# Patient Record
(demographics unavailable — no encounter records)

---

## 2024-10-15 NOTE — ASSESSMENT
[FreeTextEntry1] : 21 yo female with recent episode of right pyelonephritis now improved.  I will change her abx to Augmentin based on outpatient Ucx results.  She will continue the abx for an additional 7 days.  She will RTC as needed.

## 2024-10-15 NOTE — HISTORY OF PRESENT ILLNESS
[FreeTextEntry1] : 21 yo female presents for outpatient follow up for recent episode of right pyelonephritis.  She was initially treated as an outpatient and then presented to an outside hospital for high fevers and severe flank pain. She was unhappy with her care at the OSH and came to [facility] where she was admitted to the medical service for IV abx.  She was treated with Zosyn and then discharged on Bactrim.  The hospital culture came back negative but her outpatient Cx was growing Bactrim resistant E. coli.  She has been feeling well since her discharge from the hospital and denies any fevers or urinary symptoms.  She denies any prior hx of pyelonephritis.

## 2024-10-15 NOTE — PHYSICAL EXAM
[Normal Appearance] : normal appearance [General Appearance - In No Acute Distress] : no acute distress [Heart Rate And Rhythm] : heart rate and rhythm were normal [] : no respiratory distress [Abdomen Soft] : soft [Abdomen Tenderness] : non-tender [Costovertebral Angle Tenderness] : no ~M costovertebral angle tenderness [Normal Station and Gait] : the gait and station were normal for the patient's age [Skin Color & Pigmentation] : normal skin color and pigmentation [No Focal Deficits] : no focal deficits [Oriented To Time, Place, And Person] : oriented to person, place, and time [Not Anxious] : not anxious

## 2024-11-06 NOTE — PHYSICAL EXAM
[Normal Appearance] : normal appearance [Heart Rate And Rhythm] : heart rate and rhythm were normal [General Appearance - In No Acute Distress] : no acute distress [] : no respiratory distress [Abdomen Soft] : soft [Abdomen Tenderness] : non-tender [Costovertebral Angle Tenderness] : no ~M costovertebral angle tenderness [Normal Station and Gait] : the gait and station were normal for the patient's age [Skin Color & Pigmentation] : normal skin color and pigmentation [No Focal Deficits] : no focal deficits [Oriented To Time, Place, And Person] : oriented to person, place, and time [Not Anxious] : not anxious

## 2024-11-06 NOTE — ASSESSMENT
[FreeTextEntry1] : 24 yo female with hx of pyelonephritis now with mild pelvic discomfort.  It is unclear if this is a true UTI.  Her urinary symptom shave resolved.  I advised her to repeat the Ucx today.  She can start abx empirically if urinary symptoms return.

## 2024-11-06 NOTE — HISTORY OF PRESENT ILLNESS
[FreeTextEntry1] : 10/14/24: 23 yo female presents for outpatient follow up for recent episode of right pyelonephritis.  She was initially treated as an outpatient and then presented to an outside hospital for high fevers and severe flank pain. She was unhappy with her care at the OSH and came to [facility] where she was admitted to the medical service for IV abx.  She was treated with Zosyn and then discharged on Bactrim.  The hospital culture came back negative but her outpatient Cx was growing Bactrim resistant E. coli.  She has been feeling well since her discharge from the hospital and denies any fevers or urinary symptoms.  She denies any prior hx of pyelonephritis.    ************ 11/6/24: Patient returns for follow up./  She has had several days of increased urgency and frequency.  She denies hematuria or dysuria.  She went to urgent care and had her urine sent for Ucx.  Cx shows 30K enterococcus which is pan-sensitive.  She was given an Rx for cipro.  She has held off taking the abx pending this appointment.  Her symptom shave mostly resolved, but she still has some pelvic and vaginal discomfort.  She denies dysuria.

## 2024-11-12 NOTE — PHYSICAL EXAM
[General Appearance - In No Acute Distress] : no acute distress [] : no respiratory distress [Oriented To Time, Place, And Person] : oriented to person, place, and time [Chaperone Present] : A chaperone was present in the examining room during all aspects of the physical examination [External Female Genitalia] : normal external genitalia [FreeTextEntry2] : Miranda Diaz RN

## 2024-11-12 NOTE — HISTORY OF PRESENT ILLNESS
[FreeTextEntry1] : 10/14/24: 23 yo female presents for outpatient follow up for recent episode of right pyelonephritis.  She was initially treated as an outpatient and then presented to an outside hospital for high fevers and severe flank pain. She was unhappy with her care at the OSH and came to [facility] where she was admitted to the medical service for IV abx.  She was treated with Zosyn and then discharged on Bactrim.  The hospital culture came back negative but her outpatient Cx was growing Bactrim resistant E. coli.  She has been feeling well since her discharge from the hospital and denies any fevers or urinary symptoms.  She denies any prior hx of pyelonephritis.    ************ 11/6/24: Patient returns for follow up./  She has had several days of increased urgency and frequency.  She denies hematuria or dysuria.  She went to urgent care and had her urine sent for Ucx.  Cx shows 30K enterococcus which is pan-sensitive.  She was given an Rx for cipro.  She has held off taking the abx pending this appointment.  Her symptom shave mostly resolved, but she still has some pelvic and vaginal discomfort.  She denies dysuria.  ********************** 11/12/24: Patient presents for straight cath specimen. She is not having any current urinary symptoms. She has had a few cultures since her incident of pyelonephritis that have grown small amount of various types of bacteria. The most recent from our office grew 10-49K Klebsiella ESBL. Discussed with patient via phone call yesterday and we decided to collect a catheterized specimen to determine if these bacteria are contaminants.

## 2024-11-12 NOTE — ASSESSMENT
[FreeTextEntry1] : 22 yo female with hx of pyelonephritis now with no urinary symptoms but recent positive UCx growing Klebsiella ESBL. We collected a straight cath specimen today and will send for culture. Would hold off on any additional antibiotics until culture is final as she is not symptomatic and these cultures likely represent contaminants rather than true infections.  - F/u UA, UCx